# Patient Record
Sex: MALE | Race: BLACK OR AFRICAN AMERICAN
[De-identification: names, ages, dates, MRNs, and addresses within clinical notes are randomized per-mention and may not be internally consistent; named-entity substitution may affect disease eponyms.]

---

## 2018-03-23 ENCOUNTER — HOSPITAL ENCOUNTER (EMERGENCY)
Dept: HOSPITAL 62 - ER | Age: 28
Discharge: HOME | End: 2018-03-23
Payer: COMMERCIAL

## 2018-03-23 VITALS — SYSTOLIC BLOOD PRESSURE: 137 MMHG | DIASTOLIC BLOOD PRESSURE: 74 MMHG

## 2018-03-23 DIAGNOSIS — Z88.6: ICD-10-CM
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
## 2018-03-23 DIAGNOSIS — M54.2: Primary | ICD-10-CM

## 2018-03-23 DIAGNOSIS — F10.929: ICD-10-CM

## 2018-03-23 DIAGNOSIS — M54.9: ICD-10-CM

## 2018-03-23 LAB
ANION GAP SERPL CALC-SCNC: 21 MMOL/L (ref 5–19)
BUN SERPL-MCNC: 14 MG/DL (ref 7–20)
CALCIUM: 10 MG/DL (ref 8.4–10.2)
CHLORIDE SERPL-SCNC: 104 MMOL/L (ref 98–107)
CO2 SERPL-SCNC: 20 MMOL/L (ref 22–30)
ETHANOL SERPL-MCNC: 227 MG/DL
GLUCOSE SERPL-MCNC: 84 MG/DL (ref 75–110)
POTASSIUM SERPL-SCNC: 4.5 MMOL/L (ref 3.6–5)
SODIUM SERPL-SCNC: 145.4 MMOL/L (ref 137–145)

## 2018-03-23 PROCEDURE — 74177 CT ABD & PELVIS W/CONTRAST: CPT

## 2018-03-23 PROCEDURE — 96360 HYDRATION IV INFUSION INIT: CPT

## 2018-03-23 PROCEDURE — 71260 CT THORAX DX C+: CPT

## 2018-03-23 PROCEDURE — 99284 EMERGENCY DEPT VISIT MOD MDM: CPT

## 2018-03-23 PROCEDURE — 36415 COLL VENOUS BLD VENIPUNCTURE: CPT

## 2018-03-23 PROCEDURE — 80048 BASIC METABOLIC PNL TOTAL CA: CPT

## 2018-03-23 PROCEDURE — 70450 CT HEAD/BRAIN W/O DYE: CPT

## 2018-03-23 PROCEDURE — 72125 CT NECK SPINE W/O DYE: CPT

## 2018-03-23 PROCEDURE — 80307 DRUG TEST PRSMV CHEM ANLYZR: CPT
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
